# Patient Record
Sex: MALE | Race: BLACK OR AFRICAN AMERICAN | NOT HISPANIC OR LATINO | Employment: UNEMPLOYED | URBAN - METROPOLITAN AREA
[De-identification: names, ages, dates, MRNs, and addresses within clinical notes are randomized per-mention and may not be internally consistent; named-entity substitution may affect disease eponyms.]

---

## 2021-06-01 ENCOUNTER — RECORDS - HEALTHEAST (OUTPATIENT)
Dept: ADMINISTRATIVE | Facility: CLINIC | Age: 51
End: 2021-06-01

## 2023-01-01 ENCOUNTER — HOSPITAL ENCOUNTER (EMERGENCY)
Facility: CLINIC | Age: 53
Discharge: HOME | End: 2023-09-21
Attending: EMERGENCY MEDICINE | Admitting: EMERGENCY MEDICINE
Payer: COMMERCIAL

## 2023-01-01 VITALS
RESPIRATION RATE: 16 BRPM | OXYGEN SATURATION: 100 % | HEART RATE: 92 BPM | TEMPERATURE: 98 F | HEIGHT: 75 IN | DIASTOLIC BLOOD PRESSURE: 95 MMHG | BODY MASS INDEX: 31.08 KG/M2 | SYSTOLIC BLOOD PRESSURE: 160 MMHG | WEIGHT: 250 LBS

## 2023-01-01 DIAGNOSIS — M25.561 CHRONIC PAIN OF BOTH KNEES: ICD-10-CM

## 2023-01-01 DIAGNOSIS — M25.562 CHRONIC PAIN OF BOTH KNEES: ICD-10-CM

## 2023-01-01 DIAGNOSIS — G89.29 CHRONIC PAIN OF BOTH KNEES: ICD-10-CM

## 2023-01-01 DIAGNOSIS — M79.642 PAIN IN BOTH HANDS: ICD-10-CM

## 2023-01-01 DIAGNOSIS — M79.641 PAIN IN BOTH HANDS: ICD-10-CM

## 2023-01-01 LAB
ANION GAP SERPL CALCULATED.3IONS-SCNC: 10 MMOL/L (ref 7–15)
BACTERIA BLD CULT: ABNORMAL
BASOPHILS # BLD AUTO: 0 10E3/UL (ref 0–0.2)
BASOPHILS NFR BLD AUTO: 0 %
BUN SERPL-MCNC: 11.3 MG/DL (ref 6–20)
CALCIUM SERPL-MCNC: 8.9 MG/DL (ref 8.6–10)
CHLORIDE SERPL-SCNC: 103 MMOL/L (ref 98–107)
CREAT SERPL-MCNC: 0.78 MG/DL (ref 0.67–1.17)
DEPRECATED HCO3 PLAS-SCNC: 26 MMOL/L (ref 22–29)
EGFRCR SERPLBLD CKD-EPI 2021: >90 ML/MIN/1.73M2
ENTEROCOCCUS FAECALIS: NOT DETECTED
ENTEROCOCCUS FAECIUM: NOT DETECTED
EOSINOPHIL # BLD AUTO: 0.2 10E3/UL (ref 0–0.7)
EOSINOPHIL NFR BLD AUTO: 3 %
ERYTHROCYTE [DISTWIDTH] IN BLOOD BY AUTOMATED COUNT: 16.9 % (ref 10–15)
GLUCOSE SERPL-MCNC: 105 MG/DL (ref 70–99)
HCT VFR BLD AUTO: 40.9 % (ref 40–53)
HGB BLD-MCNC: 12.9 G/DL (ref 13.3–17.7)
IMM GRANULOCYTES # BLD: 0 10E3/UL
IMM GRANULOCYTES NFR BLD: 0 %
LISTERIA SPECIES (DETECTED/NOT DETECTED): NOT DETECTED
LYMPHOCYTES # BLD AUTO: 1.4 10E3/UL (ref 0.8–5.3)
LYMPHOCYTES NFR BLD AUTO: 24 %
MCH RBC QN AUTO: 26.2 PG (ref 26.5–33)
MCHC RBC AUTO-ENTMCNC: 31.5 G/DL (ref 31.5–36.5)
MCV RBC AUTO: 83 FL (ref 78–100)
MONOCYTES # BLD AUTO: 0.6 10E3/UL (ref 0–1.3)
MONOCYTES NFR BLD AUTO: 11 %
NEUTROPHILS # BLD AUTO: 3.6 10E3/UL (ref 1.6–8.3)
NEUTROPHILS NFR BLD AUTO: 62 %
NRBC # BLD AUTO: 0 10E3/UL
NRBC BLD AUTO-RTO: 0 /100
PLATELET # BLD AUTO: 192 10E3/UL (ref 150–450)
POTASSIUM SERPL-SCNC: 3.2 MMOL/L (ref 3.4–5.3)
RBC # BLD AUTO: 4.92 10E6/UL (ref 4.4–5.9)
SODIUM SERPL-SCNC: 139 MMOL/L (ref 136–145)
STAPHYLOCOCCUS AUREUS: NOT DETECTED
STAPHYLOCOCCUS EPIDERMIDIS: NOT DETECTED
STAPHYLOCOCCUS LUGDUNENSIS: NOT DETECTED
STAPHYLOCOCCUS SPECIES: DETECTED
STREPTOCOCCUS AGALACTIAE: NOT DETECTED
STREPTOCOCCUS ANGINOSUS GROUP: NOT DETECTED
STREPTOCOCCUS PNEUMONIAE: NOT DETECTED
STREPTOCOCCUS PYOGENES: NOT DETECTED
STREPTOCOCCUS SPECIES: NOT DETECTED
WBC # BLD AUTO: 5.8 10E3/UL (ref 4–11)

## 2023-01-01 PROCEDURE — 87077 CULTURE AEROBIC IDENTIFY: CPT | Performed by: EMERGENCY MEDICINE

## 2023-01-01 PROCEDURE — 82310 ASSAY OF CALCIUM: CPT | Performed by: EMERGENCY MEDICINE

## 2023-01-01 PROCEDURE — 250N000013 HC RX MED GY IP 250 OP 250 PS 637: Performed by: EMERGENCY MEDICINE

## 2023-01-01 PROCEDURE — 99283 EMERGENCY DEPT VISIT LOW MDM: CPT

## 2023-01-01 PROCEDURE — 87149 DNA/RNA DIRECT PROBE: CPT | Performed by: EMERGENCY MEDICINE

## 2023-01-01 PROCEDURE — 85025 COMPLETE CBC W/AUTO DIFF WBC: CPT | Performed by: EMERGENCY MEDICINE

## 2023-01-01 PROCEDURE — 36415 COLL VENOUS BLD VENIPUNCTURE: CPT | Performed by: EMERGENCY MEDICINE

## 2023-01-01 RX ORDER — GABAPENTIN 300 MG/1
300 CAPSULE ORAL ONCE
Status: COMPLETED | OUTPATIENT
Start: 2023-01-01 | End: 2023-01-01

## 2023-01-01 RX ORDER — GABAPENTIN 300 MG/1
300 CAPSULE ORAL 3 TIMES DAILY PRN
Qty: 30 CAPSULE | Refills: 0 | Status: SHIPPED | OUTPATIENT
Start: 2023-01-01

## 2023-01-01 RX ORDER — ACETAMINOPHEN 500 MG
1000 TABLET ORAL ONCE
Status: COMPLETED | OUTPATIENT
Start: 2023-01-01 | End: 2023-01-01

## 2023-01-01 RX ADMIN — ACETAMINOPHEN 1000 MG: 500 TABLET, FILM COATED ORAL at 04:18

## 2023-01-01 RX ADMIN — GABAPENTIN 300 MG: 300 CAPSULE ORAL at 04:17

## 2023-01-01 ASSESSMENT — ACTIVITIES OF DAILY LIVING (ADL)
ADLS_ACUITY_SCORE: 35

## 2023-09-21 NOTE — ED PROVIDER NOTES
"    History     Chief Complaint:  Joint Pain and Knee Pain       The history is provided by the patient.      Sohan Fields Jr. is a 52 year old male with arthralgia. The patient reports that he has been having various throbbing pain and swelling in his hands, knees, and feet for about 5 months, with symptoms increasing in severity over the past 2 weeks. He states that he attempted to see a rheumatologist but has not been able to get in for an appt until November. Sohan states that the pain makes him unable to walk except with great difficulty. Additionally, he notes that he has had a rash on both his hands for a similar period of time.  Patient denies taking any pain medication, and denies any triggering incident or trauma. He notes that he does fall a lot due to the joint pain and difficulty in ambulation. Sohan denies any fever, vomiting, abdominal pain, diarrhea, suicidal ideation, or hallucinations. He does have feelings of hopelessness, does not feel mentally unstable, but does not have access to his prescribed mental health medications. He states that he is currently living in a tent. He denies any IV drug use.    Independent Historian:    None - patient only    Review of External Notes:  I reviewed the notes from the patient's hospital admission dated 8/6/23 and his ER visit dated 8/26/23. This provided information regarding the patient's recent medical history and baseline.     Medications:    Gabapentin PO    Past Medical History:    No past medical history on file.    Past Surgical History:    No past surgical history on file.       Physical Exam   Patient Vitals for the past 24 hrs:   BP Temp Temp src Pulse Resp SpO2 Height Weight   09/21/23 0111 (!) 160/95 98  F (36.7  C) Oral 92 16 100 % 1.905 m (6' 3\") 113.4 kg (250 lb)        Physical Exam  VS: Reviewed per above  HENT: Mucous membranes moist  EYES: sclera anicteric  CV: Rate as noted,  regular rhythm.   RESP: Effort normal. Breath sounds are " normal bilaterally.  NEURO: Alert, moving all extremities  MSK: No deformity of the extremities, tenderness about the bilateral hands and knees.  No objective joint effusions.  No overlying redness or warmth of his joints.  SKIN: Warm and dry, papular rash to the bilateral palms.      Emergency Department Course       Laboratory:  Labs Ordered and Resulted from Time of ED Arrival to Time of ED Departure   BASIC METABOLIC PANEL - Abnormal       Result Value    Sodium 139      Potassium 3.2 (*)     Chloride 103      Carbon Dioxide (CO2) 26      Anion Gap 10      Urea Nitrogen 11.3      Creatinine 0.78      Calcium 8.9      Glucose 105 (*)     GFR Estimate >90     CBC WITH PLATELETS AND DIFFERENTIAL - Abnormal    WBC Count 5.8      RBC Count 4.92      Hemoglobin 12.9 (*)     Hematocrit 40.9      MCV 83      MCH 26.2 (*)     MCHC 31.5      RDW 16.9 (*)     Platelet Count 192      % Neutrophils 62      % Lymphocytes 24      % Monocytes 11      % Eosinophils 3      % Basophils 0      % Immature Granulocytes 0      NRBCs per 100 WBC 0      Absolute Neutrophils 3.6      Absolute Lymphocytes 1.4      Absolute Monocytes 0.6      Absolute Eosinophils 0.2      Absolute Basophils 0.0      Absolute Immature Granulocytes 0.0      Absolute NRBCs 0.0     BLOOD CULTURE   BLOOD CULTURE     Emergency Department Course & Assessments:    Interventions:  Medications   gabapentin (NEURONTIN) capsule 300 mg (300 mg Oral $Given 9/21/23 0417)   acetaminophen (TYLENOL) tablet 1,000 mg (1,000 mg Oral $Given 9/21/23 0418)        Assessments:  0334 I obtained history and examined the patient as noted above.      Disposition:  The patient was discharged to home.     Impression & Plan        Medical Decision Making:  Patient presents to the ER for evaluation of progressive arthralgia, which has been an issue over the past 5 months.  Vital signs reassuring.  On exam he has tenderness of the bilateral knees and hands but no objective effusion or  redness or warmth.  I reviewed hospital admission at [facility] from last month when he underwent extensive medical and rheumatologic evaluation for arthralgias.  He was treated for cellulitis versus abscess about the right elbow at that time but other joint aspirations were negative for infection.  He was treated with antibiotics.  Ultimately he was referred to rheumatology for ongoing evaluation.  Today I do not appreciate signs of significant joint effusion or septic arthropathy.  No trauma to suggest occult bony injury in the hands or knees.  Basic labs are reassuring.  Blood cultures sent to rule out occult bacteremia despite lack of fevers or obvious infectious symptoms.  Patient was interested in gabapentin refilled to help control his pain while he awaits rheumatology evaluation.  I did place another stat referral to rheumatology in the interim.  Return precautions discussed prior to discharge.    Diagnosis:    ICD-10-CM    1. Pain in both hands  M79.641 Adult Rheumatology  Referral    M79.642       2. Chronic pain of both knees  M25.561 Adult Rheumatology  Referral    M25.562     G89.29            Discharge Medications:  Discharge Medication List as of 9/21/2023  5:31 AM        START taking these medications    Details   !! gabapentin (NEURONTIN) 300 MG capsule Take 1 capsule (300 mg) by mouth 3 times daily as needed for neuropathic pain, Disp-30 capsule, R-0, Local Print       !! - Potential duplicate medications found. Please discuss with provider.             Scribe Disclosure:  I, Tristen Vanegas, am serving as a scribe at 4:12 AM on 9/21/2023 to document services personally performed by Mike Suarez MD, based on my observations and the provider's statements to me.  9/21/2023   Mike Suarez MD Lindenbaum, Elan, MD  09/21/23 0627

## 2023-09-21 NOTE — ED NOTES
Bed: ED21  Expected date: 9/21/23  Expected time: 12:51 AM  Means of arrival: Ambulance  Comments:  [address] 52M joint pain x 1 wk

## 2023-09-21 NOTE — ED TRIAGE NOTES
Pt presents to ED via EMS for joint pain. Pt states his hands, knees, and feet are in pain. Pt A&O x 4. Pt states since April he has been dealing with the pain. Pt states he has been on the go for the past 3 days and hasn't been eating. Pt states he had MRSA from last hospital stay and didn't finish the antibiotics but they gave him some in the hospital so he states he is unsure of cure status.      Triage Assessment       Row Name 09/21/23 0114       Triage Assessment (Adult)    Airway WDL WDL       Respiratory WDL    Respiratory WDL WDL       Cardiac WDL    Cardiac WDL WDL       Cognitive/Neuro/Behavioral WDL    Cognitive/Neuro/Behavioral WDL WDL

## 2023-09-21 NOTE — DISCHARGE INSTRUCTIONS
Return for fevers, new concerns or symptoms. Please see the rheumatology clinic for further testing.

## 2023-09-22 NOTE — ED NOTES
I received notice from the lab that the patient's blood culture that was obtained yesterday has resulted positive for staph.  I attempted calling the patient at the number that is listed, but unfortunately the prerecorded message states the phone number is no longer in service. I attempted twice.     Edmundo Thompson MD on 9/22/2023 at 8:14 AM       Edmundo Thompson MD  09/22/23 0815

## 2024-01-01 ENCOUNTER — HOSPITAL ENCOUNTER (EMERGENCY)
Facility: CLINIC | Age: 54
Discharge: HOME | End: 2024-04-15
Attending: EMERGENCY MEDICINE | Admitting: EMERGENCY MEDICINE
Payer: COMMERCIAL

## 2024-01-01 VITALS
SYSTOLIC BLOOD PRESSURE: 159 MMHG | OXYGEN SATURATION: 99 % | RESPIRATION RATE: 16 BRPM | DIASTOLIC BLOOD PRESSURE: 89 MMHG | TEMPERATURE: 98.3 F | HEART RATE: 96 BPM

## 2024-01-01 DIAGNOSIS — M25.50 ARTHRALGIA, UNSPECIFIED JOINT: ICD-10-CM

## 2024-01-01 PROCEDURE — 250N000013 HC RX MED GY IP 250 OP 250 PS 637: Performed by: EMERGENCY MEDICINE

## 2024-01-01 PROCEDURE — 99283 EMERGENCY DEPT VISIT LOW MDM: CPT

## 2024-01-01 RX ORDER — GABAPENTIN 300 MG/1
600 CAPSULE ORAL ONCE
Status: COMPLETED | OUTPATIENT
Start: 2024-01-01 | End: 2024-01-01

## 2024-01-01 RX ORDER — GABAPENTIN 600 MG/1
600 TABLET ORAL 3 TIMES DAILY
Qty: 90 TABLET | Refills: 0 | Status: SHIPPED | OUTPATIENT
Start: 2024-01-01 | End: 2024-05-15

## 2024-01-01 RX ADMIN — GABAPENTIN 600 MG: 300 CAPSULE ORAL at 13:18

## 2024-01-01 ASSESSMENT — ACTIVITIES OF DAILY LIVING (ADL)
ADLS_ACUITY_SCORE: 35
ADLS_ACUITY_SCORE: 35

## 2024-01-01 ASSESSMENT — COLUMBIA-SUICIDE SEVERITY RATING SCALE - C-SSRS
2. HAVE YOU ACTUALLY HAD ANY THOUGHTS OF KILLING YOURSELF IN THE PAST MONTH?: NO
6. HAVE YOU EVER DONE ANYTHING, STARTED TO DO ANYTHING, OR PREPARED TO DO ANYTHING TO END YOUR LIFE?: NO
1. IN THE PAST MONTH, HAVE YOU WISHED YOU WERE DEAD OR WISHED YOU COULD GO TO SLEEP AND NOT WAKE UP?: NO
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
